# Patient Record
Sex: FEMALE | Race: WHITE | NOT HISPANIC OR LATINO | Employment: FULL TIME
[De-identification: names, ages, dates, MRNs, and addresses within clinical notes are randomized per-mention and may not be internally consistent; named-entity substitution may affect disease eponyms.]

---

## 2017-08-05 ENCOUNTER — HEALTH MAINTENANCE LETTER (OUTPATIENT)
Age: 47
End: 2017-08-05

## 2019-07-21 ENCOUNTER — APPOINTMENT (OUTPATIENT)
Dept: ULTRASOUND IMAGING | Facility: CLINIC | Age: 49
End: 2019-07-21
Attending: EMERGENCY MEDICINE
Payer: COMMERCIAL

## 2019-07-21 ENCOUNTER — APPOINTMENT (OUTPATIENT)
Dept: CT IMAGING | Facility: CLINIC | Age: 49
End: 2019-07-21
Attending: EMERGENCY MEDICINE
Payer: COMMERCIAL

## 2019-07-21 ENCOUNTER — HOSPITAL ENCOUNTER (EMERGENCY)
Facility: CLINIC | Age: 49
Discharge: HOME | End: 2019-07-21
Attending: EMERGENCY MEDICINE | Admitting: EMERGENCY MEDICINE
Payer: COMMERCIAL

## 2019-07-21 VITALS
OXYGEN SATURATION: 98 % | TEMPERATURE: 99.1 F | WEIGHT: 115 LBS | BODY MASS INDEX: 19.63 KG/M2 | SYSTOLIC BLOOD PRESSURE: 106 MMHG | HEART RATE: 65 BPM | DIASTOLIC BLOOD PRESSURE: 77 MMHG | HEIGHT: 64 IN

## 2019-07-21 DIAGNOSIS — K62.89 PROCTITIS: ICD-10-CM

## 2019-07-21 LAB
ALBUMIN SERPL-MCNC: 4.2 G/DL (ref 3.4–5)
ALP SERPL-CCNC: 54 U/L (ref 40–150)
ALT SERPL W P-5'-P-CCNC: 21 U/L (ref 0–50)
ANION GAP SERPL CALCULATED.3IONS-SCNC: 7 MMOL/L (ref 3–14)
AST SERPL W P-5'-P-CCNC: 17 U/L (ref 0–45)
BASOPHILS # BLD AUTO: 0 10E9/L (ref 0–0.2)
BASOPHILS NFR BLD AUTO: 0.1 %
BILIRUB SERPL-MCNC: 0.7 MG/DL (ref 0.2–1.3)
BUN SERPL-MCNC: 5 MG/DL (ref 7–30)
CALCIUM SERPL-MCNC: 9.3 MG/DL (ref 8.5–10.1)
CHLORIDE SERPL-SCNC: 106 MMOL/L (ref 94–109)
CO2 SERPL-SCNC: 25 MMOL/L (ref 20–32)
CREAT SERPL-MCNC: 0.6 MG/DL (ref 0.52–1.04)
DIFFERENTIAL METHOD BLD: NORMAL
EOSINOPHIL # BLD AUTO: 0 10E9/L (ref 0–0.7)
EOSINOPHIL NFR BLD AUTO: 0.1 %
ERYTHROCYTE [DISTWIDTH] IN BLOOD BY AUTOMATED COUNT: 13.2 % (ref 10–15)
GFR SERPL CREATININE-BSD FRML MDRD: >90 ML/MIN/{1.73_M2}
GLUCOSE SERPL-MCNC: 89 MG/DL (ref 70–99)
HCG SERPL QL: NEGATIVE
HCT VFR BLD AUTO: 41.5 % (ref 35–47)
HGB BLD-MCNC: 14.5 G/DL (ref 11.7–15.7)
IMM GRANULOCYTES # BLD: 0 10E9/L (ref 0–0.4)
IMM GRANULOCYTES NFR BLD: 0.1 %
LYMPHOCYTES # BLD AUTO: 1.8 10E9/L (ref 0.8–5.3)
LYMPHOCYTES NFR BLD AUTO: 18.2 %
MCH RBC QN AUTO: 31.1 PG (ref 26.5–33)
MCHC RBC AUTO-ENTMCNC: 34.9 G/DL (ref 31.5–36.5)
MCV RBC AUTO: 89 FL (ref 78–100)
MONOCYTES # BLD AUTO: 0.7 10E9/L (ref 0–1.3)
MONOCYTES NFR BLD AUTO: 6.9 %
NEUTROPHILS # BLD AUTO: 7.5 10E9/L (ref 1.6–8.3)
NEUTROPHILS NFR BLD AUTO: 74.6 %
NRBC # BLD AUTO: 0 10*3/UL
NRBC BLD AUTO-RTO: 0 /100
PLATELET # BLD AUTO: 197 10E9/L (ref 150–450)
POTASSIUM SERPL-SCNC: 3.3 MMOL/L (ref 3.4–5.3)
PROT SERPL-MCNC: 7.6 G/DL (ref 6.8–8.8)
RBC # BLD AUTO: 4.66 10E12/L (ref 3.8–5.2)
SODIUM SERPL-SCNC: 138 MMOL/L (ref 133–144)
TSH SERPL DL<=0.005 MIU/L-ACNC: 3.97 MU/L (ref 0.4–4)
WBC # BLD AUTO: 10.1 10E9/L (ref 4–11)

## 2019-07-21 PROCEDURE — 85025 COMPLETE CBC W/AUTO DIFF WBC: CPT | Performed by: EMERGENCY MEDICINE

## 2019-07-21 PROCEDURE — 84443 ASSAY THYROID STIM HORMONE: CPT | Performed by: EMERGENCY MEDICINE

## 2019-07-21 PROCEDURE — 96361 HYDRATE IV INFUSION ADD-ON: CPT

## 2019-07-21 PROCEDURE — 25000128 H RX IP 250 OP 636: Performed by: EMERGENCY MEDICINE

## 2019-07-21 PROCEDURE — 76705 ECHO EXAM OF ABDOMEN: CPT

## 2019-07-21 PROCEDURE — 74177 CT ABD & PELVIS W/CONTRAST: CPT

## 2019-07-21 PROCEDURE — 99285 EMERGENCY DEPT VISIT HI MDM: CPT | Mod: 25

## 2019-07-21 PROCEDURE — 96360 HYDRATION IV INFUSION INIT: CPT | Mod: 59

## 2019-07-21 PROCEDURE — 25500064 ZZH RX 255 OP 636: Performed by: EMERGENCY MEDICINE

## 2019-07-21 PROCEDURE — 80053 COMPREHEN METABOLIC PANEL: CPT | Performed by: EMERGENCY MEDICINE

## 2019-07-21 PROCEDURE — 84703 CHORIONIC GONADOTROPIN ASSAY: CPT | Performed by: EMERGENCY MEDICINE

## 2019-07-21 PROCEDURE — 25000125 ZZHC RX 250: Performed by: EMERGENCY MEDICINE

## 2019-07-21 RX ORDER — SODIUM CHLORIDE 9 MG/ML
1000 INJECTION, SOLUTION INTRAVENOUS CONTINUOUS
Status: DISCONTINUED | OUTPATIENT
Start: 2019-07-21 | End: 2019-07-21 | Stop reason: HOSPADM

## 2019-07-21 RX ADMIN — SODIUM CHLORIDE 1000 ML: 9 INJECTION, SOLUTION INTRAVENOUS at 16:23

## 2019-07-21 RX ADMIN — SODIUM CHLORIDE 60 ML: 9 INJECTION, SOLUTION INTRAVENOUS at 17:13

## 2019-07-21 RX ADMIN — IOHEXOL 60 ML: 350 INJECTION, SOLUTION INTRAVENOUS at 17:12

## 2019-07-21 ASSESSMENT — ENCOUNTER SYMPTOMS
DYSURIA: 0
ABDOMINAL PAIN: 1
NAUSEA: 0
BLOOD IN STOOL: 1
FEVER: 0
HEMATURIA: 0
CONSTIPATION: 1
VOMITING: 0
FREQUENCY: 0

## 2019-07-21 ASSESSMENT — MIFFLIN-ST. JEOR: SCORE: 1136.64

## 2019-07-21 NOTE — ED PROVIDER NOTES
"  History     Chief Complaint:  Constipation     HPI   Ashly Limon is a 49 year old female who presents with constipation. The patient reports that two days ago she noticed a decrease in her appetite and was just feeling \"not herself\" throughout the day. She states that that evening, she developed diffuse abdominal cramping along with the urge to pass a bowel movement. The patient continues that when she went to use the restroom, she was unable to pass any stool. She states that her abdominal cramping continued throughout the night. Ms Limon also began to notice some bright red blood coming out her rectum and notes that she did pass some very loose, watery stools throughout the night. Her symptoms continued into the next day, and she did not have a bowel movement despite drinking two Magnesium Citrate drinks. The patient states that she was able to be seen at her child's pediatric clinic today. They were unable to do any imaging but recommended that she try a Fleets enema. The patient states that she did this but did not have any results. This prompted her to come to the ED for evaluation. Here, the patient feels like \"there is something blocking\" her stool from coming out. Her bowel movements are typically very regular, with her last one being three days ago. She denies fever, nausea, or vomiting as well as any urinary symptoms. The patient has not had any exposure to sick contacts, recent travel, abdominal surgeries, or recent antibiotic use. She has not started any new medications or eaten any suspicious foods. The patient is currently using the Mirena IUD for contraception, and her last period was approximately two weeks ago.     Allergies:  Lexapro    Medications:    Vitamin C  Beta Carotene  Folic Acid  Mirena   Multivitamin  Selenium   Vitamin E    Past Medical History:    Abdominal pain  Cervical dysplasia    Endometritis     Past Surgical History:    Foot debridement, left  Tooth Extraction " "  LEEP    Family History:    The patient reports a maternal history of hypertension, lipid disorder, osteoporosis, ovarian cancer, and colorectal cancer. The patient reports a paternal history of lipid disorder, cardiovascular disease, hypertension, and cancer. The patient denies any other relevant family history.     Social History:  The patient is . The patient reports alcohol use of 1-2 drinks per week. She denies tobacco or drug use.    Review of Systems   Constitutional: Negative for fever.   Gastrointestinal: Positive for abdominal pain, blood in stool and constipation. Negative for nausea and vomiting.   Genitourinary: Negative for dysuria, frequency, hematuria and urgency.   All other systems reviewed and are negative.    Physical Exam   First Vitals:  BP: 124/80  Pulse: 131  Temp: 99.1  F (37.3  C)  Height: 162.6 cm (5' 4\")  Weight: 52.2 kg (115 lb)  SpO2: 97 %    Physical Exam  Nursing note and vitals reviewed.  Constitutional:  Oriented to person, place, and time. Cooperative.   HENT:   Nose:    Nose normal.   Mouth/Throat:   Mucous membranes are normal.   Eyes:    Conjunctivae normal and EOM are normal.      Pupils are equal, round, and reactive to light.   Neck:    Trachea normal.   Cardiovascular:  Tachycardic rate. Regular rhythm, normal heart sounds and normal pulses. No murmur heard.  Pulmonary/Chest:  Effort normal and breath sounds normal.   Abdominal:   Soft. Normal appearance and bowel sounds are normal.      No tenderness to palpation at this time.     There is no rebound and no CVA tenderness.    Rectal:   Very small external hemorrhoids. No stool or blood in the vault. No internal masses appreciated.     Musculoskeletal:  Extremities atraumatic x 4.   Lymphadenopathy:  No cervical adenopathy.   Neurological:   Alert and oriented to person, place, and time. Normal strength.      No cranial nerve deficit or sensory deficit. GCS eye subscore is 4. GCS verbal subscore is 5. GCS motor " subscore is 6.   Skin:    Skin is intact. No rash noted.   Psychiatric:   Normal mood and affect.    Emergency Department Course     Imaging:  Radiographic findings were communicated with the patient who voiced understanding of the findings.    CT Abdomen Pelvis w/ contrast:  IMPRESSION: 1. Proctitis with circumferential rectal wall thickening. No obvious abscess where imaged. Follow-up colonoscopy as needed to exclude the possibility of a rectal mass. Remainder of the bowel is unremarkable. Appendix not visualized. 2. Mild gallbladder wall thickening without distention or calcified gallstones. Correlate for any clinical symptoms of cholecystitis. Follow-up gallbladder ultrasound if needed for further assessment. Report per radiology.    US Abdomen Limited (RUQ):  IMPRESSION: 1. No evidence of gallstones or bile duct dilatation. No gallbladder wall thickening or evidence of cholecystitis. 2. Mild fatty infiltration of liver. 3. Stable angiomyolipoma lower pole right kidney. No further follow-up required. Report per radiology.     Laboratory:  CBC: WNL (WBC 10.1, HGB 14.5, )  CMP: Potassium 3.3 (low), BUN 5 (low), o/w WNL (Creatinine 0.60)  HCG: Negative  TSH: 3.97    Interventions:  Normal Saline 1L IV injection     Emergency Department Course:  Nursing notes and vitals reviewed. I performed an exam of the patient as documented above.   IV inserted and blood drawn. The patient was placed on continuous cardiac monitoring and pulse oximetry.   4:21 PM The patient was given NLS IV, dosage as noted above.     5:59 PM Recheck. Reviewed results with the patient.   6:08 PM CT scan obtained.  Results as above.  Findings discussed with the patient.   7:53 PM PM US obtained.  Results as above.  Findings discussed with the patient.   7:35 PM Discussed the case with Dr. Bower, on call for GI.   Findings and plan explained to the Patient. Patient discharged home with instructions regarding supportive care, medications,  and reasons to return. The importance of close follow-up was reviewed.     Impression & Plan      Medical Decision Making:  This is a 49-year-old female who came in with rectal bleeding as well as the feeling of being constipated.  I felt it was reasonable to check the above blood work as well as a CT scan to evaluate for the possibility of colitis or diverticulitis as well as any signs of a bowel obstruction.  Her blood work is unremarkable, which is reassuring.  Her CT scan however shows signs of proctitis.  The CT also showed the possibility of gallbladder proceeded with an ultrasound of her gallbladder, which does not show anything acute. It is unclear what the cause of her proctitis is, and therefore I spoke with one of the GI physicians on-call, Dr. Bower.  He indicated that the patient would be able to be seen in his clinic tomorrow at which time they were discussed the plan for her.  The patient does not want a medicine to go home with.  I instructed her to return with any concerns or worsening symptoms as well.  She should follow-up with her primary physician too.      Diagnosis:    ICD-10-CM   1. Proctitis K62.89       Disposition:  Discharged to home      I, Jayla Alamo, am serving as a scribe at 4:13 PM on 7/21/2019 to document services personally performed by Pedro Blackwell MD, based on my observations and the provider's statements to me.       Pedro Blackwell MD  07/21/19 1411

## 2019-07-21 NOTE — ED AVS SNAPSHOT
Emergency Department  [address] [place] [address]  Phone:  [phone]  Fax:  [phone]                                    Ashly Limon   MRN: [id number]    Department:   Emergency Department   Date of Visit:  7/21/2019           After Visit Summary Signature Page    I have received my discharge instructions, and my questions have been answered. I have discussed any challenges I see with this plan with the nurse or doctor.    ..........................................................................................................................................  Patient/Patient Representative Signature      ..........................................................................................................................................  Patient Representative Print Name and Relationship to Patient    ..................................................               ................................................  Date                                   Time    ..........................................................................................................................................  Reviewed by Signature/Title    ...................................................              ..............................................  Date                                               Time          22EPIC Rev 08/18

## 2019-07-21 NOTE — ED TRIAGE NOTES
Wasn't feeling well on Thursday 7/18. Hasn't had BM since 7/19. Completed fleet enema & mag citrate per PCPs recommendations. Today starting having bright red bleeding per rectum. PCP was not able to do any imaging, so sent pt to ED.

## 2019-11-05 ENCOUNTER — HEALTH MAINTENANCE LETTER (OUTPATIENT)
Age: 49
End: 2019-11-05

## 2020-11-22 ENCOUNTER — HEALTH MAINTENANCE LETTER (OUTPATIENT)
Age: 50
End: 2020-11-22

## 2021-02-13 ENCOUNTER — HEALTH MAINTENANCE LETTER (OUTPATIENT)
Age: 51
End: 2021-02-13

## 2021-09-19 ENCOUNTER — HEALTH MAINTENANCE LETTER (OUTPATIENT)
Age: 51
End: 2021-09-19

## 2022-01-09 ENCOUNTER — HEALTH MAINTENANCE LETTER (OUTPATIENT)
Age: 52
End: 2022-01-09

## 2022-03-06 ENCOUNTER — HEALTH MAINTENANCE LETTER (OUTPATIENT)
Age: 52
End: 2022-03-06

## 2022-11-20 ENCOUNTER — HEALTH MAINTENANCE LETTER (OUTPATIENT)
Age: 52
End: 2022-11-20

## 2023-04-16 ENCOUNTER — HEALTH MAINTENANCE LETTER (OUTPATIENT)
Age: 53
End: 2023-04-16

## 2023-06-02 ENCOUNTER — HEALTH MAINTENANCE LETTER (OUTPATIENT)
Age: 53
End: 2023-06-02

## 2024-07-27 ENCOUNTER — HOSPITAL ENCOUNTER (EMERGENCY)
Facility: CLINIC | Age: 54
Discharge: HOME | End: 2024-07-27
Attending: EMERGENCY MEDICINE | Admitting: EMERGENCY MEDICINE
Payer: COMMERCIAL

## 2024-07-27 ENCOUNTER — OFFICE VISIT (OUTPATIENT)
Dept: URGENT CARE | Facility: URGENT CARE | Age: 54
End: 2024-07-27
Payer: COMMERCIAL

## 2024-07-27 ENCOUNTER — APPOINTMENT (OUTPATIENT)
Dept: GENERAL RADIOLOGY | Facility: CLINIC | Age: 54
End: 2024-07-27
Attending: EMERGENCY MEDICINE
Payer: COMMERCIAL

## 2024-07-27 VITALS
HEART RATE: 76 BPM | RESPIRATION RATE: 20 BRPM | OXYGEN SATURATION: 99 % | TEMPERATURE: 98.2 F | DIASTOLIC BLOOD PRESSURE: 82 MMHG | SYSTOLIC BLOOD PRESSURE: 125 MMHG

## 2024-07-27 VITALS
HEART RATE: 70 BPM | HEIGHT: 64 IN | SYSTOLIC BLOOD PRESSURE: 113 MMHG | RESPIRATION RATE: 17 BRPM | TEMPERATURE: 97.8 F | WEIGHT: 130 LBS | DIASTOLIC BLOOD PRESSURE: 78 MMHG | BODY MASS INDEX: 22.2 KG/M2 | OXYGEN SATURATION: 99 %

## 2024-07-27 DIAGNOSIS — R07.9 CHEST PAIN, UNSPECIFIED TYPE: ICD-10-CM

## 2024-07-27 DIAGNOSIS — R07.89 ATYPICAL CHEST PAIN: Primary | ICD-10-CM

## 2024-07-27 DIAGNOSIS — R10.13 EPIGASTRIC PAIN: ICD-10-CM

## 2024-07-27 DIAGNOSIS — R10.13 MIDEPIGASTRIC PAIN: ICD-10-CM

## 2024-07-27 LAB
ALBUMIN SERPL BCG-MCNC: 4.4 G/DL (ref 3.5–5.2)
ALP SERPL-CCNC: 59 U/L (ref 40–150)
ALT SERPL W P-5'-P-CCNC: 17 U/L (ref 0–50)
ANION GAP SERPL CALCULATED.3IONS-SCNC: 9 MMOL/L (ref 7–15)
AST SERPL W P-5'-P-CCNC: 20 U/L (ref 0–45)
BILIRUB SERPL-MCNC: 0.4 MG/DL
BUN SERPL-MCNC: 10.3 MG/DL (ref 6–20)
CALCIUM SERPL-MCNC: 9.3 MG/DL (ref 8.8–10.4)
CHLORIDE SERPL-SCNC: 106 MMOL/L (ref 98–107)
CREAT SERPL-MCNC: 0.73 MG/DL (ref 0.51–0.95)
EGFRCR SERPLBLD CKD-EPI 2021: >90 ML/MIN/1.73M2
ERYTHROCYTE [DISTWIDTH] IN BLOOD BY AUTOMATED COUNT: 13.1 % (ref 10–15)
GLUCOSE SERPL-MCNC: 85 MG/DL (ref 70–99)
HCO3 SERPL-SCNC: 28 MMOL/L (ref 22–29)
HCT VFR BLD AUTO: 40.7 % (ref 35–47)
HGB BLD-MCNC: 13.9 G/DL (ref 11.7–15.7)
HOLD SPECIMEN: NORMAL
HOLD SPECIMEN: NORMAL
LIPASE SERPL-CCNC: 37 U/L (ref 13–60)
MCH RBC QN AUTO: 31 PG (ref 26.5–33)
MCHC RBC AUTO-ENTMCNC: 34.2 G/DL (ref 31.5–36.5)
MCV RBC AUTO: 91 FL (ref 78–100)
PLATELET # BLD AUTO: 201 10E3/UL (ref 150–450)
POTASSIUM SERPL-SCNC: 3.5 MMOL/L (ref 3.4–5.3)
PROT SERPL-MCNC: 6.9 G/DL (ref 6.4–8.3)
RBC # BLD AUTO: 4.48 10E6/UL (ref 3.8–5.2)
SODIUM SERPL-SCNC: 143 MMOL/L (ref 135–145)
TROPONIN T SERPL HS-MCNC: <6 NG/L
WBC # BLD AUTO: 6 10E3/UL (ref 4–11)

## 2024-07-27 PROCEDURE — 36415 COLL VENOUS BLD VENIPUNCTURE: CPT | Performed by: EMERGENCY MEDICINE

## 2024-07-27 PROCEDURE — 85027 COMPLETE CBC AUTOMATED: CPT | Performed by: EMERGENCY MEDICINE

## 2024-07-27 PROCEDURE — 82040 ASSAY OF SERUM ALBUMIN: CPT | Performed by: EMERGENCY MEDICINE

## 2024-07-27 PROCEDURE — 99285 EMERGENCY DEPT VISIT HI MDM: CPT | Mod: 25

## 2024-07-27 PROCEDURE — 250N000013 HC RX MED GY IP 250 OP 250 PS 637: Performed by: EMERGENCY MEDICINE

## 2024-07-27 PROCEDURE — 71046 X-RAY EXAM CHEST 2 VIEWS: CPT

## 2024-07-27 PROCEDURE — 93000 ELECTROCARDIOGRAM COMPLETE: CPT | Performed by: FAMILY MEDICINE

## 2024-07-27 PROCEDURE — 99204 OFFICE O/P NEW MOD 45 MIN: CPT | Performed by: FAMILY MEDICINE

## 2024-07-27 PROCEDURE — 84484 ASSAY OF TROPONIN QUANT: CPT | Performed by: EMERGENCY MEDICINE

## 2024-07-27 PROCEDURE — 93005 ELECTROCARDIOGRAM TRACING: CPT

## 2024-07-27 PROCEDURE — 83690 ASSAY OF LIPASE: CPT | Performed by: EMERGENCY MEDICINE

## 2024-07-27 RX ORDER — MAGNESIUM HYDROXIDE/ALUMINUM HYDROXICE/SIMETHICONE 120; 1200; 1200 MG/30ML; MG/30ML; MG/30ML
15 SUSPENSION ORAL ONCE
Status: COMPLETED | OUTPATIENT
Start: 2024-07-27 | End: 2024-07-27

## 2024-07-27 RX ADMIN — ALUMINUM HYDROXIDE, MAGNESIUM HYDROXIDE, AND SIMETHICONE 15 ML: 200; 200; 20 SUSPENSION ORAL at 18:00

## 2024-07-27 ASSESSMENT — ACTIVITIES OF DAILY LIVING (ADL): ADLS_ACUITY_SCORE: 35

## 2024-07-27 NOTE — PATIENT INSTRUCTIONS
To [facility] ER for further evaluation of acute onset of atypical chest pain followed by acute onset midepigastric pain radiating to the back concerning for possible pancreatitis , normal EKG, patient concerned this may be cardiac I requested a troponin in clinic today  acute onset of mid sternal chest pain radiating into back this a.m., 2 hours later and worsening pain in the area which is drinking water, no history of pancreatitis, rarely drinks alcohol, increased stressors with son having significant health issues    at present no chest pain, EKG in clinic was normal.  No personal history of CAD, non-smoker positive family history of CAD of a father who was a non-smoker in his late 50s early 60s,   Denies shortness of breath, no BHAT, unable to reproduce the pain with palpation    significant pain with palpation of the mid epigastric area-and history of pain radiating to the back   No fever, no cough or congestion, no nausea vomiting diarrhea, no constipation, no history of midepigastric pain or GERD in the past is not having any GERD symptoms,     Vital signs stable no fever, pulse 76 O2 sats 99%

## 2024-07-27 NOTE — ED TRIAGE NOTES
Patient had a sharp midsternal chest pain that radiated to her back that happened early this morning that lasted approx 10 mins. Patient went back to sleep and woke up later with upper abd pain, and that is tender to touch. Patient has had no abd surgeries. Patient went to  and was referred to ER for troponin labs.

## 2024-07-27 NOTE — ED PROVIDER NOTES
"  Emergency Department Note      History of Present Illness     Chief Complaint   Chest Pain      HPI   Ashly Limon is a 54 year old female who presents for an episode of mid chest pain with radiation to her mid back occuring at approximately 0400 this morning. Patient explains waking up from sleep at 0400 with an intense, squeezing-like chest pain that lasted approximately 10-15 minutes. Patient was able to fall back asleep afterwards. When she got up 2 hours later, she still felt epigastric discomfort when drinking water. Currently feels this epigastric discomfort, but denies chest pain. Denies daily alcohol use, fevers, vomiting, taking Pepto or Tums, or any medical problems. She reports being under a lot of stress recently due to her son having recent medical problems.     Independent Historian   None    Review of External Notes   Reviewed patient's urgent care visit from earlier today, patient was seen for atypical chest pain with mid epigastric pain.  Sent to ER for further evaluation.    Past Medical History     Medical History and Problem List   Cervical dysplasia    Medications   The patient is currently on no regular medications.    Surgical History   Left foot, gravel debridement  Tooth extraction   LEEP, cervical     Physical Exam     Patient Vitals for the past 24 hrs:   BP Temp Temp src Pulse Resp SpO2 Height Weight   07/27/24 1800 113/78 -- -- 70 -- 99 % -- --   07/27/24 1739 122/83 -- -- 81 17 99 % -- --   07/27/24 1715 123/74 97.8  F (36.6  C) Oral 72 16 100 % 1.626 m (5' 4\") 59 kg (130 lb)     Physical Exam  General: Well-nourished, resting comfortably when I enter the room  Eyes: Pupils equal, conjunctivae pink no scleral icterus or conjunctival injection  ENT:  Moist mucus membranes  Respiratory:  Lungs clear to auscultation bilaterally, no crackles/rubs/wheezes.  Good air movement  CV: Normal rate and rhythm, no murmurs  GI:  Abdomen soft and non-distended.  No tenderness, guarding or " rebound  Skin: Warm, dry.  No rashes or petechiae  Musculoskeletal: No peripheral edema or calf tenderness  Neuro: Alert and oriented to person/place/time  Psychiatric: Normal affect    Diagnostics   Lab Results   Labs Ordered and Resulted from Time of ED Arrival to Time of ED Departure   CBC WITH PLATELETS - Normal       Result Value    WBC Count 6.0      RBC Count 4.48      Hemoglobin 13.9      Hematocrit 40.7      MCV 91      MCH 31.0      MCHC 34.2      RDW 13.1      Platelet Count 201     COMPREHENSIVE METABOLIC PANEL - Normal    Sodium 143      Potassium 3.5      Carbon Dioxide (CO2) 28      Anion Gap 9      Urea Nitrogen 10.3      Creatinine 0.73      GFR Estimate >90      Calcium 9.3      Chloride 106      Glucose 85      Alkaline Phosphatase 59      AST 20      ALT 17      Protein Total 6.9      Albumin 4.4      Bilirubin Total 0.4     LIPASE - Normal    Lipase 37     TROPONIN T, HIGH SENSITIVITY - Normal    Troponin T, High Sensitivity <6       Imaging   Chest XR,  PA & LAT   Final Result   IMPRESSION: Negative chest.        EKG   ECG taken at 1717, ECG read at 1726  Normal sinus rhythm with sinus arrhythmia. Low voltage QRS. Nonspecific ST abnormality. Abnormal ECG.   Rate 72 bpm. MS interval 158 ms. QRS duration 70 ms. QT/QTc 368/402 ms. P-R-T axes 67 49 49.    Independent Interpretation   Chest x-ray does not show any sign of consolidation or air under the diaphragm.    ED Course      Medications Administered   Medications   alum & mag hydroxide-simethicone (MAALOX) suspension 15 mL (15 mLs Oral $Given 7/27/24 1800)     Procedures   Procedures     Discussion of Management   None    ED Course   ED Course as of 07/27/24 1829   Sat Jul 27, 2024 1728 I obtained history and examined the patient as noted above.     1826 I rechecked the patient and explained findings. We discussed plan for discharge and patient is in agreement with plan.        Optional/Additional Documentation  None    Medical Decision  Making / Diagnosis     CMS Diagnoses: None    MIPS       None    [facility]   Ashly Limon is a 54 year old female presents emergency department with a complaint of chest pain and epigastric pain.  On exam patient does not have any tenderness to palpation in the epigastrium or right upper quadrant.  Her abdomen is soft, no peritoneal signs.  Lung sounds are clear.  Workup is overall reassuring.  No signs of pancreatitis.  No signs of acute coronary disease.  I did consider PE, but as the patient's symptoms have resolved, and she did not have any shortness of breath or tachypnea.  She is also not tachycardic I have low suspicion for PE.  Chest x-ray does not show any acute abnormalities.  I have greater suspicion that this is dyspepsia.  Patient is feeling better after a GI cocktail.  I will have her follow-up with her primary care physician.  Will also put her on omeprazole.  Patient is discharged home.    Disposition   The patient was discharged.     Diagnosis     ICD-10-CM    1. Epigastric pain  R10.13       2. Chest pain, unspecified type  R07.9              Scribe Disclosure:  I, Chyna Garcia, am serving as a scribe at 6:03 PM on 7/27/2024 to document services personally performed by Yovana Kay MD based on my observations and the provider's statements to me.            Yovana Kay MD  07/27/24 6202

## 2024-07-27 NOTE — PROGRESS NOTES
ASSESSMENT/PLAN:      ICD-10-CM    1. Atypical chest pain  R07.89 EKG 12-lead complete w/read - Clinics      2. Midepigastric pain  R10.13           Patient Instructions   To [facility] ER for further evaluation of acute onset of atypical chest pain followed by acute onset midepigastric pain radiating to the back concerning for possible pancreatitis , normal EKG, patient concerned this may be cardiac I requested a troponin in clinic today  acute onset of mid sternal chest pain radiating into back this a.m., 2 hours later and worsening pain in the area which is drinking water, no history of pancreatitis, rarely drinks alcohol, increased stressors with son having significant health issues    at present no chest pain, EKG in clinic was normal.  No personal history of CAD, non-smoker positive family history of CAD of a father who was a non-smoker in his late 50s early 60s,   Denies shortness of breath, no BHAT, unable to reproduce the pain with palpation    significant pain with palpation of the mid epigastric area-and history of pain radiating to the back   No fever, no cough or congestion, no nausea vomiting diarrhea, no constipation, no history of midepigastric pain or GERD in the past is not having any GERD symptoms,     Vital signs stable no fever, pulse 76 O2 sats 99%    Reviewed medication instructions and side effects. Follow up if experiences side effects.     I reviewed supportive care, otc meds to use if needed, expected course, and signs of concern.  Follow up as needed or if she does not improve within  1-2 days or if worsens in any way.  Reviewed red flag symptoms and is to go to the ER if experiences any of these.     The use of [employer] services may have been used to construct the content in this note; any grammatical or spelling errors are non-intentional. Please contact the author of this note directly if you are in need of any clarification.      On the day of the encounter, time  spend on chart review, patient visit, review of testing, documentation was 30 minutes              Patient presents with:  acute chest pain: Started today around early morning, more Epigastric area and pain when drinking water. Also having mid back pain with this.       Subjective     Ashly Limon is a 54 year old female who presents to clinic today for the following health issues:    HPI      Patient with acute onset of atypical chest pain followed by acute onset midepigastric pain radiating to the back concerning for possible pancreatitis , normal EKG, patient concerned this may be cardiac I requested a troponin in clinic today  Patient with acute onset of mid sternal chest pain radiating into her upper back awaken from sleep at 4 AM unable to sleep and 2 hours later onset midepigastric pain, pain increased after drinking some water  no history of pancreatitis, rarely drinks alcohol  Increased stressors-her son  is having significant health issues    At present no chest pain or pressure in the midsternal area,patient concerned her episode of midsternal chest pain may be cardiac and she requested to have a troponin has completed and in urgent care today   Denies shortness of breath, no BHAT,  No personal history of CAD, non-smoker   positive family history of CAD of a father who was a non-smoker in his late 50s early 60s,   Denies shortness of breath, no BHAT, unable to reproduce the pain with palpation    No fever, no cough or congestion, no nausea vomiting diarrhea, no constipation, no GERD symptoms,   no history of midepigastric pain or GERD in the past         Past Medical History:   Diagnosis Date    Abdominal pain, other specified site     us ?renal lesion, fu ct benign    Cervical dysplasia     had leep procedure     (normal spontaneous vaginal delivery)     x2     Social History     Tobacco Use    Smoking status: Never    Smokeless tobacco: Never   Substance Use Topics    Alcohol use: Yes      Alcohol/week: 0.8 - 1.7 standard drinks of alcohol     Types: 1 - 2 Standard drinks or equivalent per week     Comment: rare- 1/week        Current Outpatient Medications   Medication Sig Dispense Refill    Ascorbic Acid (VITAMIN C PO) Take 1,500 mg by mouth daily.      BETA CAROTENE PO Take 31,000 Units by mouth daily.      FOLIC ACID PO Take 1,600 mcg by mouth daily.      levonorgestrel (MIRENA) 20 MCG/24HR IUD 1 each by Intrauterine route once.      multivitamin, therapeutic with minerals (MULTI-VITAMIN) TABS Take 1 tablet by mouth daily. 100 tablet 3    SELENIUM PO Take 225 mcg by mouth daily.      VITAMIN E 430 Units daily.      omeprazole (PRILOSEC) 20 MG DR capsule Take 1 capsule (20 mg) by mouth daily for 30 days 30 capsule 0     Allergies   Allergen Reactions    Escitalopram Oxalate Nausea and Vomiting and Difficulty breathing             ROS are negative, except as otherwise noted HPI      Objective    /82   Pulse 76   Temp 98.2  F (36.8  C)   Resp 20   LMP  (LMP Unknown)   SpO2 99%   There is no height or weight on file to calculate BMI.  Physical Exam   GENERAL: alert and mild distress  EYES: Eyes grossly normal to inspection, PERRL and conjunctivae and sclerae normal  HENT: ear canals and TM's normal, nose and mouth without ulcers or lesions  NECK: no adenopathy, no asymmetry,   RESP: lungs clear to auscultation - no rales, rhonchi or wheezes  CV: regular rate and rhythm, normal S1 S2, no S3 or S4, no murmur, click or rub,   CHEST-able to reproduce chest pain with palpation across the anterior chest and mid and over the sternum and the base of the sternum  ABDOMEN-soft, midepigastric pain with palpation, no hepatosplenomegaly, positive bowel sounds  MS: no gross musculoskeletal defects noted, no edema  NEURO: Normal strength and tone, mentation intact and speech normal, normal gait      Diagnostic Test Results:  Labs reviewed in Epic       EKG Interpretation:      Interpreted by Danica DUMONT  MD [place]    Symptoms at time of EKG: none   Rhythm: Normal sinus   Rate: Normal  Axis: Normal  Ectopy: None  Conduction: Normal  ST Segments/ T Waves: No ST-T wave changes and No acute ischemic changes  Q Waves: None  Comparison to prior: No old EKG available    Clinical Impression: normal EKG

## 2024-07-28 LAB
ATRIAL RATE - MUSE: 72 BPM
DIASTOLIC BLOOD PRESSURE - MUSE: NORMAL MMHG
INTERPRETATION ECG - MUSE: NORMAL
P AXIS - MUSE: 67 DEGREES
PR INTERVAL - MUSE: 158 MS
QRS DURATION - MUSE: 70 MS
QT - MUSE: 368 MS
QTC - MUSE: 402 MS
R AXIS - MUSE: 49 DEGREES
SYSTOLIC BLOOD PRESSURE - MUSE: NORMAL MMHG
T AXIS - MUSE: 49 DEGREES
VENTRICULAR RATE- MUSE: 72 BPM

## 2024-08-31 ENCOUNTER — HEALTH MAINTENANCE LETTER (OUTPATIENT)
Age: 54
End: 2024-08-31

## 2025-04-19 ENCOUNTER — HEALTH MAINTENANCE LETTER (OUTPATIENT)
Age: 55
End: 2025-04-19